# Patient Record
Sex: MALE | Race: WHITE | Employment: FULL TIME | URBAN - METROPOLITAN AREA
[De-identification: names, ages, dates, MRNs, and addresses within clinical notes are randomized per-mention and may not be internally consistent; named-entity substitution may affect disease eponyms.]

---

## 2023-01-25 ENCOUNTER — TELEPHONE (OUTPATIENT)
Dept: ENT CLINIC | Age: 53
End: 2023-01-25

## 2023-01-25 NOTE — TELEPHONE ENCOUNTER
Pt called in to [facility] an appt, we have a referral from Dr. Fredy Russell. I offered to [facility] with np, pt declined. He only wants to see Dr. Zuly Landaverde. He needs an appt after 1:30 due to work [facility]. I advised pt, doctor only has morning appointments avail and pt stated \"well, how is that going to work? \" Will Dr. Zuly Landaverde have pm appts avail in the future? Please, advise. Thank you.  Dinesh Albert can be reached at [phone]

## 2023-01-26 NOTE — TELEPHONE ENCOUNTER
LMOVM for patient to call office to schedule.   Informed patient that there are afternoon appt in [place] on Mondays

## 2023-02-21 ENCOUNTER — PROCEDURE VISIT (OUTPATIENT)
Dept: AUDIOLOGY | Age: 53
End: 2023-02-21

## 2023-02-21 ENCOUNTER — OFFICE VISIT (OUTPATIENT)
Dept: ENT CLINIC | Age: 53
End: 2023-02-21

## 2023-02-21 VITALS — HEIGHT: 69 IN | BODY MASS INDEX: 32.58 KG/M2 | WEIGHT: 220 LBS

## 2023-02-21 DIAGNOSIS — H90.3 SENSORINEURAL HEARING LOSS (SNHL) OF BOTH EARS: Primary | ICD-10-CM

## 2023-02-21 DIAGNOSIS — H69.83 DYSFUNCTION OF BOTH EUSTACHIAN TUBES: ICD-10-CM

## 2023-02-21 DIAGNOSIS — J34.89 NASAL DRYNESS: ICD-10-CM

## 2023-02-21 PROCEDURE — 99203 OFFICE O/P NEW LOW 30 MIN: CPT | Performed by: NURSE PRACTITIONER

## 2023-02-21 PROCEDURE — 92557 COMPREHENSIVE HEARING TEST: CPT | Performed by: AUDIOLOGIST

## 2023-02-21 PROCEDURE — 92567 TYMPANOMETRY: CPT | Performed by: AUDIOLOGIST

## 2023-02-21 RX ORDER — LOSARTAN POTASSIUM 50 MG/1
50 TABLET ORAL DAILY
COMMUNITY

## 2023-02-21 RX ORDER — ECHINACEA PURPUREA EXTRACT 125 MG
2 TABLET ORAL 4 TIMES DAILY
Qty: 3 EACH | Refills: 3 | Status: SHIPPED | OUTPATIENT
Start: 2023-02-21

## 2023-02-21 RX ORDER — HYDROCHLOROTHIAZIDE 12.5 MG/1
12.5 TABLET ORAL DAILY
COMMUNITY

## 2023-02-21 RX ORDER — FLUTICASONE PROPIONATE 50 MCG
2 SPRAY, SUSPENSION (ML) NASAL DAILY
Qty: 16 G | Refills: 1 | Status: SHIPPED | OUTPATIENT
Start: 2023-02-21

## 2023-02-21 RX ORDER — ASPIRIN 81 MG/1
81 TABLET ORAL DAILY
COMMUNITY

## 2023-02-21 NOTE — PROGRESS NOTES
[id number] [facility]  Dr. Ivan Garcia D.O. Ms.Ed. New Consult       Patient Name:  Juana Topete  :  1970     CHIEF C/O:    Chief Complaint   Patient presents with    New Patient     NP clogged ears, hearing issue       HISTORY OBTAINED FROM:  patient    HISTORY OF PRESENT ILLNESS:       Mohinder Saxena is a 46y.o. year old male, here today for hearing loss with possible eustachian tube dysfunction. Patient states he has noticed symptoms for several years with mild decrease in his hearing with frequent cracking and popping in his ears. He denies any history of recurrent ear infections or previous ear surgeries. He denies previous diagnosis of hearing loss. Patient does have moderate exposure to noise with frequent time spent in [employer]. He denies any family history of hearing loss. He does note a constant buzzing tinnitus in both ears that is worse in quiet rooms. He denies any dizziness. He denies any persistent allergy symptoms with no current congestion, rhinorrhea, or postnasal drainage. He denies any current allergy treatments.         Past Medical History:   Diagnosis Date    Fatigue     GERD (gastroesophageal reflux disease)     Headache     Hypertension     S/P splenectomy 2016     Past Surgical History:   Procedure Laterality Date    ABDOMEN SURGERY      EYE SURGERY      HERNIA REPAIR      SPLENECTOMY      VASECTOMY         Current Outpatient Medications:     aspirin 81 MG EC tablet, Take 81 mg by mouth daily, Disp: , Rfl:     losartan (COZAAR) 50 MG tablet, Take 50 mg by mouth daily, Disp: , Rfl:     hydroCHLOROthiazide (HYDRODIURIL) 12.5 MG tablet, Take 12.5 mg by mouth daily, Disp: , Rfl:     ibuprofen (ADVIL;MOTRIN) 200 MG tablet, Take 200 mg by mouth every 6 hours as needed for Pain, Disp: , Rfl:     CREON 83124 UNITS CPEP, 36,000 Units (Patient not taking: Reported on 2023), Disp: , Rfl:     omeprazole (PRILOSEC) 40 MG delayed release capsule, Take 40 mg by mouth daily  (Patient not taking: Reported on 2/21/2023), Disp: , Rfl:   Patient has no known allergies. Social History     Tobacco Use    Smoking status: Never    Smokeless tobacco: Never   Substance Use Topics    Alcohol use: No    Drug use: No     Family History   Problem Relation Age of Onset    Heart Disease Father     Cancer Maternal Grandmother     Heart Disease Maternal Grandmother     Cancer Maternal Aunt     Diabetes Maternal Uncle     Heart Disease Maternal Uncle     High Blood Pressure Maternal Uncle     High Cholesterol Maternal Uncle        Review of Systems   HENT:  Positive for hearing loss and tinnitus (Buzzing sound). Negative for congestion, ear discharge, ear pain, postnasal drip, rhinorrhea, sinus pressure and sinus pain. Ht 5' 9\" (1.753 m)   Wt 220 lb (99.8 kg)   BMI 32.49 kg/m²   Physical Exam  Constitutional:       Appearance: Normal appearance. HENT:      Head: Normocephalic. Right Ear: Tympanic membrane, ear canal and external ear normal. Decreased hearing noted. Left Ear: Tympanic membrane, ear canal and external ear normal. Decreased hearing noted. Nose: No rhinorrhea. Right Turbinates: Pale. Left Turbinates: Pale. Mouth/Throat:      Lips: Pink. Mouth: Mucous membranes are moist.      Pharynx: Oropharynx is clear. Eyes:      Conjunctiva/sclera: Conjunctivae normal.      Pupils: Pupils are equal, round, and reactive to light. Cardiovascular:      Rate and Rhythm: Normal rate and regular rhythm. Pulses: Normal pulses. Pulmonary:      Effort: Pulmonary effort is normal. No respiratory distress. Breath sounds: No stridor. Musculoskeletal:         General: Normal range of motion. Cervical back: Normal range of motion. No rigidity. No muscular tenderness. Skin:     General: Skin is warm and dry. Neurological:      General: No focal deficit present. Mental Status: He is alert and oriented to person, place, and time. Psychiatric:         Mood and Affect: Mood normal.         Behavior: Behavior normal.         Thought Content: Thought content normal.         Judgment: Judgment normal.                 Audiogram and tympanogram reviewed with patient. Audiogram reveals 30 dB hearing loss in the right ear with 90% discrimination at 65 dB, 30 dB of hearing loss in the left ear with 100% discrimination at 65 dB. Audiogram is symmetrical. Tympanogram reveals type A curve in the right ear, with type A curve in the left ear. IMPRESSION/PLAN:     Regina Esparza was seen today for new patient. Diagnoses and all orders for this visit:    Sensorineural hearing loss (SNHL) of both ears    Nasal dryness    Dysfunction of both eustachian tubes    Other orders  -     fluticasone (FLONASE) 50 MCG/ACT nasal spray; 2 sprays by Each Nostril route daily  -     sodium chloride (ALTAMIST SPRAY) 0.65 % nasal spray; 2 sprays by Nasal route 4 times daily    At this time patient will be placed on Flonase, 2 sprays each nostril once daily with nasal saline spray, 2 sprays each nostril 3-4 times daily for nasal dryness and intermittent eustachian tube dysfunction. Audiogram is discussed with the patient showing mild to moderate bilateral sensorineural hearing loss. Discussed with patient that he would likely qualify for hearing aids if he was interested but he denies interest at this time. He will follow-up in 6 weeks for reevaluation of his symptoms.   He is instructed to call with any new or worsening of symptoms prior to his next appointment      JIL Flores, FNP-C  [facility], Nose and Throat    The information contained in this note has been dictated using drug and medical speech recognition software and may contain errors

## 2023-02-22 NOTE — PROGRESS NOTES
This patient was referred for audiometric and tympanometric testing by Stephan Sacks, APRN-CNP due to hearing loss. Audiometry using pure tone air and bone conduction testing revealed an essentially mild sloping to profound sensorineural hearing loss, bilaterally. Reliability was good. Speech reception thresholds were in good agreement with the pure tone averages, bilaterally. Speech discrimination scores were excellent, bilaterally. Tympanometry revealed normal middle ear peak pressure and compliance, in the right ear. Left ear tympanometry could not be completed due to patient movement and pain. The results were reviewed with the patient. Recommendations for follow up will be made pending physician consult.     Ana Connolly [facility]  [address] [facility] [id number]   Electronically signed by Ana Connolly on 2/22/2023 at 11:20 AM

## 2023-02-24 ASSESSMENT — ENCOUNTER SYMPTOMS
RHINORRHEA: 0
SINUS PAIN: 0
SINUS PRESSURE: 0